# Patient Record
Sex: FEMALE | Race: ASIAN | URBAN - NONMETROPOLITAN AREA
[De-identification: names, ages, dates, MRNs, and addresses within clinical notes are randomized per-mention and may not be internally consistent; named-entity substitution may affect disease eponyms.]

---

## 2021-07-06 ENCOUNTER — LAB OUTSIDE AN ENCOUNTER (OUTPATIENT)
Dept: URBAN - NONMETROPOLITAN AREA CLINIC 4 | Facility: CLINIC | Age: 41
End: 2021-07-06

## 2021-07-06 ENCOUNTER — WEB ENCOUNTER (OUTPATIENT)
Dept: URBAN - NONMETROPOLITAN AREA CLINIC 4 | Facility: CLINIC | Age: 41
End: 2021-07-06

## 2021-07-06 ENCOUNTER — OFFICE VISIT (OUTPATIENT)
Dept: URBAN - NONMETROPOLITAN AREA CLINIC 4 | Facility: CLINIC | Age: 41
End: 2021-07-06
Payer: COMMERCIAL

## 2021-07-06 VITALS
HEIGHT: 61 IN | SYSTOLIC BLOOD PRESSURE: 97 MMHG | HEART RATE: 95 BPM | BODY MASS INDEX: 22.77 KG/M2 | DIASTOLIC BLOOD PRESSURE: 69 MMHG | TEMPERATURE: 99 F | WEIGHT: 120.6 LBS

## 2021-07-06 DIAGNOSIS — R13.19 CERVICAL DYSPHAGIA: ICD-10-CM

## 2021-07-06 DIAGNOSIS — K21.9 GASTROESOPHAGEAL REFLUX DISEASE, UNSPECIFIED WHETHER ESOPHAGITIS PRESENT: ICD-10-CM

## 2021-07-06 PROBLEM — 30233002: Status: ACTIVE | Noted: 2021-07-06

## 2021-07-06 PROCEDURE — 99203 OFFICE O/P NEW LOW 30 MIN: CPT | Performed by: INTERNAL MEDICINE

## 2021-07-06 RX ORDER — DEXLANSOPRAZOLE 60 MG/1
1 CAPSULE CAPSULE, DELAYED RELEASE ORAL ONCE A DAY
Qty: 30 | Refills: 1 | OUTPATIENT
Start: 2021-07-06

## 2021-07-06 NOTE — HPI-TODAY'S VISIT:
7/6/21: Pt is a 42 yo female with no significant PMH who presents on self referral for evaluation of GERD.  Pt reports 2 year history of GERD, worse in past week or so. Having associated odynophagia and dysphagia. Denies nausea, vomiting, abdomnial pain, melena or hematochezia. Denies any chronic NSAID use. She sleeps with wedge, which helps. Has been seen by other GI specialists in past. She has tried Omerazole and Protonix in past with no relief. She had EGD in Dec 2019 in [place] with no acute findings. She has been taking OTC omeprazole 20 mg for past 4 weeks.

## 2021-07-06 NOTE — PHYSICAL EXAM NECK/THYROID:
normal appearance , without tenderness upon palpation , no deformities , trachea midline , Thyroid normal size , no thyroid nodules , no masses , no JVD , thyroid nontender
absent

## 2021-07-30 ENCOUNTER — OFFICE VISIT (OUTPATIENT)
Dept: URBAN - METROPOLITAN AREA SURGERY CENTER 14 | Facility: SURGERY CENTER | Age: 41
End: 2021-07-30
Payer: COMMERCIAL

## 2021-07-30 ENCOUNTER — CLAIMS CREATED FROM THE CLAIM WINDOW (OUTPATIENT)
Dept: URBAN - METROPOLITAN AREA CLINIC 4 | Facility: CLINIC | Age: 41
End: 2021-07-30
Payer: COMMERCIAL

## 2021-07-30 DIAGNOSIS — R13.19 CERVICAL DYSPHAGIA: ICD-10-CM

## 2021-07-30 DIAGNOSIS — K31.819 ACQUIRED ARTERIOVENOUS MALFORMATION OF STOMACH: ICD-10-CM

## 2021-07-30 DIAGNOSIS — K31.89 GASTRIC FOVEOLAR HYPERPLASIA: ICD-10-CM

## 2021-07-30 DIAGNOSIS — K22.8 OTHER SPECIFIED DISEASES OF ESOPHAGUS: ICD-10-CM

## 2021-07-30 DIAGNOSIS — K21.9 ACID REFLUX: ICD-10-CM

## 2021-07-30 DIAGNOSIS — K21.9 GASTRO-ESOPHAGEAL REFLUX DISEASE WITHOUT ESOPHAGITIS: ICD-10-CM

## 2021-07-30 DIAGNOSIS — K22.4 CORKSCREW ESOPHAGUS: ICD-10-CM

## 2021-07-30 PROCEDURE — 88305 TISSUE EXAM BY PATHOLOGIST: CPT | Performed by: PATHOLOGY

## 2021-07-30 PROCEDURE — G8907 PT DOC NO EVENTS ON DISCHARG: HCPCS | Performed by: INTERNAL MEDICINE

## 2021-07-30 PROCEDURE — 43450 DILATE ESOPHAGUS 1/MULT PASS: CPT | Performed by: INTERNAL MEDICINE

## 2021-07-30 PROCEDURE — 88312 SPECIAL STAINS GROUP 1: CPT | Performed by: PATHOLOGY

## 2021-07-30 PROCEDURE — 43239 EGD BIOPSY SINGLE/MULTIPLE: CPT | Performed by: INTERNAL MEDICINE

## 2021-08-20 ENCOUNTER — OFFICE VISIT (OUTPATIENT)
Dept: URBAN - NONMETROPOLITAN AREA CLINIC 4 | Facility: CLINIC | Age: 41
End: 2021-08-20
Payer: COMMERCIAL

## 2021-08-20 DIAGNOSIS — K21.9 GASTROESOPHAGEAL REFLUX DISEASE, UNSPECIFIED WHETHER ESOPHAGITIS PRESENT: ICD-10-CM

## 2021-08-20 DIAGNOSIS — R13.10 DYSPHAGIA, UNSPECIFIED TYPE: ICD-10-CM

## 2021-08-20 DIAGNOSIS — K31.819 VASCULAR ECTASIA OF STOMACH: ICD-10-CM

## 2021-08-20 PROBLEM — 40739000: Status: ACTIVE | Noted: 2021-07-06

## 2021-08-20 PROCEDURE — 99214 OFFICE O/P EST MOD 30 MIN: CPT | Performed by: INTERNAL MEDICINE

## 2021-08-20 RX ORDER — DEXLANSOPRAZOLE 60 MG/1
1 CAPSULE CAPSULE, DELAYED RELEASE ORAL ONCE A DAY
Qty: 30 | Refills: 1 | Status: ACTIVE | COMMUNITY
Start: 2021-07-06

## 2021-08-20 RX ORDER — DEXLANSOPRAZOLE 60 MG/1
1 CAPSULE CAPSULE, DELAYED RELEASE ORAL TWICE A DAY
Qty: 180 CAPSULE | Refills: 2 | OUTPATIENT
Start: 2021-07-06

## 2021-08-20 NOTE — HPI-TODAY'S VISIT:
7/6/21: Pt is a 40 yo female with no significant PMH who presents on self referral for evaluation of GERD.  Pt reports 2 year history of GERD, worse in past week or so. Having associated odynophagia and dysphagia. Denies nausea, vomiting, abdomnial pain, melena or hematochezia. Denies any chronic NSAID use. She sleeps with wedge, which helps. Has been seen by other GI specialists in past. She has tried Omerazole and Protonix in past with no relief. She had EGD in Dec 2019 in [place] with no acute findings. She has been taking OTC omeprazole 20 mg for past 4 weeks.  8/20/21: Pt RTC for f/u. Underwent EGD on 7/30/21: - Z-line regular, 38 cm from the incisors. - Abnormal esophageal motility. Dilated. - Erythematous mucosa in the antrum. - A single non-bleeding angioectasia in the stomach. - Normal examined duodenum. - Multiple biopsies were obtained in the upper third of the esophagus and in the lower third of the esophagus. - Biopsies were taken with a cold forceps for Helicobacter pylori testing Bx benign  She is feeling somewhat better, but continues to c/o reflux, cough and postprandial fullness. Taking Dexilant 60 mg daily.

## 2021-10-15 ENCOUNTER — OFFICE VISIT (OUTPATIENT)
Dept: URBAN - NONMETROPOLITAN AREA CLINIC 4 | Facility: CLINIC | Age: 41
End: 2021-10-15

## 2022-03-02 ENCOUNTER — OFFICE VISIT (OUTPATIENT)
Dept: URBAN - NONMETROPOLITAN AREA CLINIC 4 | Facility: CLINIC | Age: 42
End: 2022-03-02
Payer: COMMERCIAL

## 2022-03-02 VITALS
TEMPERATURE: 98.2 F | BODY MASS INDEX: 23.75 KG/M2 | DIASTOLIC BLOOD PRESSURE: 63 MMHG | HEIGHT: 61 IN | HEART RATE: 84 BPM | WEIGHT: 125.8 LBS | SYSTOLIC BLOOD PRESSURE: 90 MMHG

## 2022-03-02 DIAGNOSIS — K21.9 GASTROESOPHAGEAL REFLUX DISEASE, UNSPECIFIED WHETHER ESOPHAGITIS PRESENT: ICD-10-CM

## 2022-03-02 PROBLEM — 235595009 GASTROESOPHAGEAL REFLUX DISEASE: Status: ACTIVE | Noted: 2022-03-02

## 2022-03-02 PROCEDURE — 99213 OFFICE O/P EST LOW 20 MIN: CPT | Performed by: REGISTERED NURSE

## 2022-03-02 RX ORDER — DEXLANSOPRAZOLE 60 MG/1
1 CAPSULE CAPSULE, DELAYED RELEASE ORAL TWICE A DAY
Qty: 180 CAPSULE | Refills: 2 | Status: ACTIVE | COMMUNITY
Start: 2021-07-06

## 2022-03-02 RX ORDER — DEXLANSOPRAZOLE 60 MG/1
1 CAPSULE CAPSULE, DELAYED RELEASE ORAL TWICE A DAY
Qty: 60 CAPSULE | Refills: 2 | OUTPATIENT

## 2022-03-02 RX ORDER — PANTOPRAZOLE SODIUM 40 MG/1
1 TABLET TABLET, DELAYED RELEASE ORAL TWICE A DAY
Qty: 60 TABLET | Refills: 2 | OUTPATIENT
Start: 2022-03-02

## 2022-03-02 NOTE — HPI-TODAY'S VISIT:
7/6/21: Pt is a 40 yo female with no significant PMH who presents on self referral for evaluation of GERD.  Pt reports 2 year history of GERD, worse in past week or so. Having associated odynophagia and dysphagia. Denies nausea, vomiting, abdomnial pain, melena or hematochezia. Denies any chronic NSAID use. She sleeps with wedge, which helps. Has been seen by other GI specialists in past. She has tried Omerazole and Protonix in past with no relief. She had EGD in Dec 2019 in [place] with no acute findings. She has been taking OTC omeprazole 20 mg for past 4 weeks.  8/20/21: Pt RTC for f/u. Underwent EGD on 7/30/21: - Z-line regular, 38 cm from the incisors. - Abnormal esophageal motility. Dilated. - Erythematous mucosa in the antrum. - A single non-bleeding angioectasia in the stomach. - Normal examined duodenum. - Multiple biopsies were obtained in the upper third of the esophagus and in the lower third of the esophagus. - Biopsies were taken with a cold forceps for Helicobacter pylori testing Bx benign  She is feeling somewhat better, but continues to c/o reflux, cough and postprandial fullness. Taking Dexilant 60 mg daily.  3/2/22: Pt RTC for f/u. Has gotten relief with Dexilant 60 mg BID, but Rx was getting low, so she weaned off. Tried OTC PPI with little relief. Has had worsening symptoms in past 3 days. Started taking Dexilant again. She c/o sore throat, cough, chest pressure and mild dysphagia. Denies N/V, odynophagia, abdominal pain.

## 2022-03-23 ENCOUNTER — WEB ENCOUNTER (OUTPATIENT)
Dept: URBAN - NONMETROPOLITAN AREA CLINIC 4 | Facility: CLINIC | Age: 42
End: 2022-03-23

## 2022-04-04 ENCOUNTER — TELEPHONE ENCOUNTER (OUTPATIENT)
Dept: URBAN - NONMETROPOLITAN AREA CLINIC 4 | Facility: CLINIC | Age: 42
End: 2022-04-04

## 2022-04-04 RX ORDER — PANTOPRAZOLE SODIUM 40 MG/1
1 TABLET TABLET, DELAYED RELEASE ORAL TWICE A DAY
Qty: 60 TABLET | Refills: 2
Start: 2022-03-02

## 2022-04-28 ENCOUNTER — OFFICE VISIT (OUTPATIENT)
Dept: URBAN - NONMETROPOLITAN AREA CLINIC 4 | Facility: CLINIC | Age: 42
End: 2022-04-28

## 2022-08-22 ENCOUNTER — ERX REFILL RESPONSE (OUTPATIENT)
Dept: URBAN - NONMETROPOLITAN AREA CLINIC 4 | Facility: CLINIC | Age: 42
End: 2022-08-22

## 2022-08-22 RX ORDER — PANTOPRAZOLE SODIUM 40 MG/1
1 TABLET TABLET, DELAYED RELEASE ORAL TWICE A DAY
Qty: 180 TABLET | Refills: 3 | OUTPATIENT

## 2022-08-22 RX ORDER — PANTOPRAZOLE SODIUM 40 MG/1
1 TABLET TABLET, DELAYED RELEASE ORAL TWICE A DAY
Qty: 60 TABLET | Refills: 2 | OUTPATIENT

## 2022-08-29 ENCOUNTER — DASHBOARD ENCOUNTERS (OUTPATIENT)
Age: 42
End: 2022-08-29

## 2022-08-29 ENCOUNTER — OFFICE VISIT (OUTPATIENT)
Dept: URBAN - METROPOLITAN AREA TELEHEALTH 2 | Facility: TELEHEALTH | Age: 42
End: 2022-08-29
Payer: COMMERCIAL

## 2022-08-29 ENCOUNTER — LAB OUTSIDE AN ENCOUNTER (OUTPATIENT)
Dept: URBAN - METROPOLITAN AREA TELEHEALTH 2 | Facility: TELEHEALTH | Age: 42
End: 2022-08-29

## 2022-08-29 VITALS — BODY MASS INDEX: 23.6 KG/M2 | HEIGHT: 61 IN | WEIGHT: 125 LBS

## 2022-08-29 DIAGNOSIS — R10.13 DYSPEPSIA: ICD-10-CM

## 2022-08-29 DIAGNOSIS — R13.10 PILL DYSPHAGIA: ICD-10-CM

## 2022-08-29 PROCEDURE — 99214 OFFICE O/P EST MOD 30 MIN: CPT | Performed by: INTERNAL MEDICINE

## 2022-08-29 RX ORDER — PNV NO.95/FERROUS FUM/FOLIC AC 28MG-0.8MG
1 TABLET TABLET ORAL ONCE A DAY
Status: ACTIVE | COMMUNITY
Start: 2014-08-08

## 2024-07-15 ENCOUNTER — OFFICE VISIT (OUTPATIENT)
Dept: URBAN - NONMETROPOLITAN AREA CLINIC 4 | Facility: CLINIC | Age: 44
End: 2024-07-15

## 2024-07-15 ENCOUNTER — LAB OUTSIDE AN ENCOUNTER (OUTPATIENT)
Dept: URBAN - NONMETROPOLITAN AREA CLINIC 4 | Facility: CLINIC | Age: 44
End: 2024-07-15

## 2024-07-15 VITALS
DIASTOLIC BLOOD PRESSURE: 76 MMHG | SYSTOLIC BLOOD PRESSURE: 101 MMHG | HEART RATE: 93 BPM | WEIGHT: 127.8 LBS | TEMPERATURE: 98.4 F | BODY MASS INDEX: 24.13 KG/M2 | HEIGHT: 61 IN

## 2024-07-15 RX ORDER — OMEPRAZOLE 40 MG/1
1 CAPSULE 30 MINUTES BEFORE MORNING MEAL CAPSULE, DELAYED RELEASE ORAL ONCE A DAY
Status: ACTIVE | COMMUNITY

## 2024-07-15 NOTE — HPI-TODAY'S VISIT:
42-year-old woman with GERD and history of gallbladder disease status post cholecystectomy presents to GI clinic for follow-up for her GERD and ongoing abdominal discomfort. Patient reports that since August 11 she has had ongoing abdominal discomfort described as diffuse abdominal distention.  Describes it as a dull ache.  Not postprandial.  She denies any radiation to the back.  Is associate with some nausea but no vomiting.  Has not really changed any with any PPI use.  Bowel movements are once to twice per day.  Denies any constipation or diarrhea.  She denies any blood or black tarry stool.  She denies any NSAID use.  She had a recent upper endoscopy in July 2021 gastric biopsies were negative for H. pylori.  She had evidence of reflux changes in the biopsies of the distal esophagus but no evidence of EOE.  Patient had a recent CBC CMP TSH and hepatitis C antibody which were normal.  Patient also reports long standing pill dysphagia.  On her upper endoscopy was noted by the endoscopist that her esophagus appeared dilated and tortuous concerning for underlying dysmotility. 7-15-24 Pt reports that she has non stop nasal drainage and chest discomfort. Had PND that started in Feb - had mono in Feb Double PNA and was in hospital, abx use during that time. Pt reports that she felt like prior to this she had abx.  Pt reports that she had chest pressure the entire time she was sick.  Pt reports that she had intermittent waxing and waning symptoms.  Tried allergy meds, rx nasal sprays.  Never had allergies in the past.

## 2024-07-17 ENCOUNTER — TELEPHONE ENCOUNTER (OUTPATIENT)
Dept: URBAN - METROPOLITAN AREA CLINIC 54 | Facility: CLINIC | Age: 44
End: 2024-07-17